# Patient Record
Sex: FEMALE | Race: WHITE | NOT HISPANIC OR LATINO | Employment: FULL TIME | URBAN - NONMETROPOLITAN AREA
[De-identification: names, ages, dates, MRNs, and addresses within clinical notes are randomized per-mention and may not be internally consistent; named-entity substitution may affect disease eponyms.]

---

## 2018-03-13 ENCOUNTER — TRANSCRIBE ORDERS (OUTPATIENT)
Dept: ADMINISTRATIVE | Facility: HOSPITAL | Age: 48
End: 2018-03-13

## 2018-03-13 ENCOUNTER — APPOINTMENT (OUTPATIENT)
Dept: LAB | Facility: HOSPITAL | Age: 48
End: 2018-03-13
Attending: INTERNAL MEDICINE

## 2018-03-13 DIAGNOSIS — E03.9 HYPOTHYROIDISM, UNSPECIFIED TYPE: ICD-10-CM

## 2018-03-13 DIAGNOSIS — R53.83 OTHER FATIGUE: Primary | ICD-10-CM

## 2018-03-13 LAB
25(OH)D3 SERPL-MCNC: 24 NG/ML
T4 FREE SERPL-MCNC: 0.81 NG/DL (ref 0.78–2.19)
TSH SERPL DL<=0.05 MIU/L-ACNC: 2.54 MIU/ML (ref 0.47–4.68)
VIT B12 BLD-MCNC: 450 PG/ML (ref 239–931)

## 2018-03-13 PROCEDURE — 84439 ASSAY OF FREE THYROXINE: CPT | Performed by: INTERNAL MEDICINE

## 2018-03-13 PROCEDURE — 82306 VITAMIN D 25 HYDROXY: CPT | Performed by: INTERNAL MEDICINE

## 2018-03-13 PROCEDURE — 84443 ASSAY THYROID STIM HORMONE: CPT | Performed by: INTERNAL MEDICINE

## 2018-03-13 PROCEDURE — 36415 COLL VENOUS BLD VENIPUNCTURE: CPT | Performed by: INTERNAL MEDICINE

## 2018-03-13 PROCEDURE — 82607 VITAMIN B-12: CPT | Performed by: INTERNAL MEDICINE

## 2019-01-03 ENCOUNTER — APPOINTMENT (OUTPATIENT)
Dept: CT IMAGING | Facility: HOSPITAL | Age: 49
End: 2019-01-03
Payer: OTHER MISCELLANEOUS

## 2019-01-03 ENCOUNTER — HOSPITAL ENCOUNTER (EMERGENCY)
Facility: HOSPITAL | Age: 49
Discharge: HOME | End: 2019-01-04
Attending: FAMILY MEDICINE
Payer: OTHER MISCELLANEOUS

## 2019-01-03 VITALS
HEIGHT: 66 IN | RESPIRATION RATE: 16 BRPM | WEIGHT: 188 LBS | BODY MASS INDEX: 30.22 KG/M2 | HEART RATE: 93 BPM | TEMPERATURE: 96.9 F | OXYGEN SATURATION: 100 % | DIASTOLIC BLOOD PRESSURE: 94 MMHG | SYSTOLIC BLOOD PRESSURE: 144 MMHG

## 2019-01-03 DIAGNOSIS — S16.1XXA ACUTE STRAIN OF NECK MUSCLE, INITIAL ENCOUNTER: Primary | ICD-10-CM

## 2019-01-03 PROCEDURE — 6370000000 HC RX 637 (ALT 250 FOR IP): Performed by: FAMILY MEDICINE

## 2019-01-03 PROCEDURE — 72125 CT NECK SPINE W/O DYE: CPT

## 2019-01-03 PROCEDURE — 99283 EMERGENCY DEPT VISIT LOW MDM: CPT

## 2019-01-03 RX ORDER — HYDROCODONE BITARTRATE AND ACETAMINOPHEN 5; 325 MG/1; MG/1
1 TABLET ORAL ONCE
Status: COMPLETED | OUTPATIENT
Start: 2019-01-03 | End: 2019-01-03

## 2019-01-03 RX ORDER — LEVOTHYROXINE SODIUM 88 UG/1
88 TABLET ORAL DAILY
COMMUNITY
End: 2022-01-08 | Stop reason: DRUGHIGH

## 2019-01-03 RX ADMIN — HYDROCODONE BITARTRATE AND ACETAMINOPHEN 1 TABLET: 5; 325 TABLET ORAL at 23:10

## 2019-01-03 ASSESSMENT — PAIN DESCRIPTION - LOCATION: LOCATION: NECK;BACK

## 2019-01-03 ASSESSMENT — PAIN SCALES - GENERAL
PAINLEVEL_OUTOF10: 6
PAINLEVEL_OUTOF10: 7

## 2019-01-03 ASSESSMENT — PAIN DESCRIPTION - ORIENTATION: ORIENTATION: MID;UPPER

## 2019-01-03 ASSESSMENT — PAIN DESCRIPTION - PAIN TYPE: TYPE: ACUTE PAIN

## 2020-03-11 ENCOUNTER — OFFICE (OUTPATIENT)
Dept: URBAN - METROPOLITAN AREA CLINIC 4 | Facility: CLINIC | Age: 50
End: 2020-03-11

## 2020-03-11 VITALS — DIASTOLIC BLOOD PRESSURE: 85 MMHG | WEIGHT: 200 LBS | HEIGHT: 66 IN | SYSTOLIC BLOOD PRESSURE: 127 MMHG

## 2020-03-11 DIAGNOSIS — R16.0 HEPATOMEGALY, NOT ELSEWHERE CLASSIFIED: ICD-10-CM

## 2020-03-11 DIAGNOSIS — K21.9 GASTRO-ESOPHAGEAL REFLUX DISEASE WITHOUT ESOPHAGITIS: ICD-10-CM

## 2020-03-11 DIAGNOSIS — R13.10 DYSPHAGIA, UNSPECIFIED: ICD-10-CM

## 2020-03-11 PROCEDURE — 99205 OFFICE O/P NEW HI 60 MIN: CPT | Performed by: NURSE PRACTITIONER

## 2020-03-11 RX ORDER — METOCLOPRAMIDE HYDROCHLORIDE 10 MG/1
TABLET ORAL
Qty: 60 | Refills: 1 | Status: ACTIVE
Start: 2020-03-11

## 2020-03-11 RX ORDER — OMEPRAZOLE 20 MG/1
CAPSULE, DELAYED RELEASE ORAL
Qty: 0 | Refills: 0 | Status: ACTIVE

## 2021-01-26 ENCOUNTER — HOSPITAL ENCOUNTER (EMERGENCY)
Facility: HOSPITAL | Age: 51
Discharge: HOME | End: 2021-01-26
Attending: HOSPITALIST
Payer: COMMERCIAL

## 2021-01-26 VITALS
TEMPERATURE: 96 F | HEART RATE: 80 BPM | RESPIRATION RATE: 18 BRPM | SYSTOLIC BLOOD PRESSURE: 141 MMHG | OXYGEN SATURATION: 99 % | DIASTOLIC BLOOD PRESSURE: 89 MMHG
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
## 2021-01-26 PROCEDURE — 6370000000 HC RX 637 (ALT 250 FOR IP): Performed by: HOSPITALIST

## 2021-01-26 PROCEDURE — 99283 EMERGENCY DEPT VISIT LOW MDM: CPT

## 2021-01-26 RX ORDER — AMOXICILLIN AND CLAVULANATE POTASSIUM 875; 125 MG/1; MG/1
1 TABLET, FILM COATED ORAL 2 TIMES DAILY
Qty: 20 TABLET | Refills: 0 | Status: SHIPPED | OUTPATIENT
Start: 2021-01-26 | End: 2021-02-05

## 2021-01-26 RX ORDER — IBUPROFEN 800 MG/1
800 TABLET ORAL ONCE
Status: COMPLETED | OUTPATIENT
Start: 2021-01-26 | End: 2021-01-26

## 2021-01-26 RX ORDER — IBUPROFEN 800 MG/1
800 TABLET ORAL EVERY 6 HOURS PRN
Qty: 20 TABLET | Refills: 0 | Status: SHIPPED | OUTPATIENT
Start: 2021-01-26

## 2021-01-26 RX ORDER — GABAPENTIN 300 MG/1
300 CAPSULE ORAL PRN
COMMUNITY

## 2021-01-26 RX ORDER — AMOXICILLIN AND CLAVULANATE POTASSIUM 875; 125 MG/1; MG/1
1 TABLET, FILM COATED ORAL ONCE
Status: COMPLETED | OUTPATIENT
Start: 2021-01-26 | End: 2021-01-26

## 2021-01-26 RX ADMIN — AMOXICILLIN AND CLAVULANATE POTASSIUM 1 TABLET: 875; 125 TABLET, FILM COATED ORAL at 18:30

## 2021-01-26 RX ADMIN — IBUPROFEN 800 MG: 800 TABLET, FILM COATED ORAL at 18:30

## 2021-01-26 ASSESSMENT — PAIN DESCRIPTION - LOCATION: LOCATION: WRIST

## 2021-01-26 ASSESSMENT — PAIN DESCRIPTION - ORIENTATION: ORIENTATION: LEFT

## 2021-01-26 NOTE — ED PROVIDER NOTES
[address] ENCOUNTER      Pt Name: Jadiel Moody  MRN: [id number]  YOB: 1970  Date of evaluation: 1/26/2021  Provider: Fred Thrasher DO    CHIEF COMPLAINT       Chief Complaint   Patient presents with    Animal Bite         HISTORY OF PRESENT ILLNESS  (Location/Symptom, Timing/Onset, Context/Setting, Quality, Duration, Modifying Factors, Severity.)   Jadiel Moody is a 53 y.o. female who presents to the emergency department for dog bite. Patient states that she was fixing mathias and she believes her dog may have smelled it and come inside the house. Apparently they began fighting. She states that she tried to separate the 2 and one of the dogs bit her on the right wrist area. Patient complaining of 2 small lacerations to her wrist and forearm area. Patient states that she did have some numbness and tingling with the initiation of the bite however that is resolved she states she has full sensation out to the fingers and hand area. Denies any other trauma or injury. Denies any other complaint at this time. Patient states that she is just here for evaluation because the small little bites do heart quite a bit. Patient states that she cannot remember her last tetanus injection. Nursing notes were reviewed.     REVIEW OFSYSTEMS    (2-9 systems for level 4, 10 or more for level 5)   ROS:  General:  No fevers, no chills, no weakness  Cardiovascular:  No chest pain, no palpitations  Respiratory:  No shortness of breath, no cough, no wheezing  Gastrointestinal:  No pain, no nausea, no vomiting, no diarrhea  Musculoskeletal:  No muscle pain, no joint pain  Skin:  No rash, no easy bruising, + animal bite to the right wrist  Neurologic:  No speech problems, no headache, no extremity weakness  Psychiatric:  No anxiety  Genitourinary:  No dysuria, no hematuria    Except as noted above the remainder of the review of systems was reviewed and negative. PAST MEDICAL HISTORY     Past Medical History:   Diagnosis Date    Endometriosis     Fibroids     GERD (gastroesophageal reflux disease)     Thyroid disease          SURGICAL HISTORY       Past Surgical History:   Procedure Laterality Date    BREAST SURGERY       SECTION           CURRENT MEDICATIONS       Previous Medications    CYCLOBENZAPRINE (FLEXERIL) 10 MG TABLET    Take 10 mg by mouth 3 times daily as needed for Muscle spasms    GABAPENTIN (NEURONTIN) 300 MG CAPSULE    Take 300 mg by mouth as needed. LEVOTHYROXINE (SYNTHROID) 88 MCG TABLET    Take 88 mcg by mouth Daily    TRAMADOL (ULTRAM) 50 MG TABLET    Take 50 mg by mouth every 6 hours as needed for Pain       ALLERGIES     Patient has no known allergies. FAMILY HISTORY     History reviewed. No pertinent family history.        SOCIAL HISTORY       Social History     Socioeconomic History    Marital status:      Spouse name: None    Number of children: None    Years of education: None    Highest education level: None   Occupational History    None   Social Needs    Financial resource strain: None    Food insecurity     Worry: None     Inability: None    Transportation needs     Medical: None     Non-medical: None   Tobacco Use    Smoking status: Never Smoker    Smokeless tobacco: Never Used   Substance and Sexual Activity    Alcohol use: No    Drug use: No    Sexual activity: None   Lifestyle    Physical activity     Days per week: None     Minutes per session: None    Stress: None   Relationships    Social connections     Talks on phone: None     Gets together: None     Attends Synagogue service: None     Active member of club or organization: None     Attends meetings of clubs or organizations: None     Relationship status: None    Intimate partner violence     Fear of current or ex partner: None     Emotionally abused: None     Physically abused: None     Forced sexual activity: None   Other Topics Concern    None   Social History Narrative    None         PHYSICAL EXAM    (up to 7 for level 4, 8 or more for level 5)     ED Triage Vitals [01/26/21 1741]   BP Temp Temp src Pulse Resp SpO2 Height Weight   (!) 153/103 96 °F (35.6 °C) -- 83 18 98 % -- --       Physical Exam  General :Patient is awake, alert, oriented, in no acute distress, nontoxic appearing  HEENT: Pupils are equally round and reactive to light, EOMI, conjunctivae clear. Oral mucosa is moist, no exudate. Uvula is midline  Cardiac: Heart regular rate, rhythm, no murmurs, rubs, or gallops  Lungs: Lungs are clear to auscultation, there is no wheezing, rhonchi, or rales. There is no use of accessory muscles. Abdomen: Abdomen is soft, nontender, nondistended. There is no firm or pulsatile masses, no rebound rigidity or guarding. Musculoskeletal: 5 out of 5 strength in all 4 extremities. No focal muscle deficits are appreciated, range of motion of the right wrist is slightly decreased is secondary to pain from the small dog bites but otherwise cap refill is normal less than 3 seconds. Neurovascularly intact distally. Full sensory sensation intact to hand and fingers. Neuro: Motor intact, sensory intact, level of consciousness is normal, cerebellar function is normal, reflexes are grossly normal. No evidence of incontinence or loss of bowel or bladder function, no saddle anesthesia noted   Dermatology: Skin is warm and dry, small incision/laceration to the palmar aspect of the wrist on the radial side measuring 6 mm and a second small laceration/incision noted to the dorsal radial aspect of the wrist measuring 5 mm. Each could use one small stitch for closure however patient refused to have stitches. Psych: Mentation is grossly normal, cognition is grossly normal. Affect is appropriate.       DIAGNOSTIC RESULTS     EKG: All EKG's are interpreted by the Emergency Department Physician who either signs or Co-signs this chart in the

## 2021-01-26 NOTE — ED NOTES
2 small bite marks on right wrist cleaned with sterile water and hibaclense     Elsy Duty  01/26/21 8004

## 2022-01-08 ENCOUNTER — OFFICE VISIT (OUTPATIENT)
Dept: PRIMARY CARE CLINIC | Age: 52
End: 2022-01-08
Payer: COMMERCIAL

## 2022-01-08 VITALS
OXYGEN SATURATION: 97 % | TEMPERATURE: 97.4 F | DIASTOLIC BLOOD PRESSURE: 89 MMHG | BODY MASS INDEX: 30.79 KG/M2 | HEART RATE: 91 BPM | RESPIRATION RATE: 16 BRPM | HEIGHT: 66 IN | WEIGHT: 191.6 LBS | SYSTOLIC BLOOD PRESSURE: 125 MMHG

## 2022-01-08 DIAGNOSIS — J40 BRONCHITIS: ICD-10-CM

## 2022-01-08 DIAGNOSIS — R05.9 COUGH: Primary | ICD-10-CM

## 2022-01-08 PROCEDURE — 99212 OFFICE O/P EST SF 10 MIN: CPT | Performed by: NURSE PRACTITIONER

## 2022-01-08 RX ORDER — PREDNISONE 20 MG/1
20 TABLET ORAL 2 TIMES DAILY
Qty: 10 TABLET | Refills: 0 | Status: SHIPPED | OUTPATIENT
Start: 2022-01-08 | End: 2022-01-13

## 2022-01-08 RX ORDER — OMEPRAZOLE 20 MG/1
CAPSULE, DELAYED RELEASE ORAL
COMMUNITY

## 2022-01-08 RX ORDER — LEVOTHYROXINE SODIUM 0.03 MG/1
TABLET ORAL
COMMUNITY

## 2022-01-08 RX ORDER — AZITHROMYCIN 250 MG/1
250 TABLET, FILM COATED ORAL SEE ADMIN INSTRUCTIONS
Qty: 6 TABLET | Refills: 0 | Status: SHIPPED | OUTPATIENT
Start: 2022-01-08 | End: 2022-01-13

## 2022-01-08 ASSESSMENT — ENCOUNTER SYMPTOMS
SHORTNESS OF BREATH: 1
GASTROINTESTINAL NEGATIVE: 1
COUGH: 1

## 2022-01-08 ASSESSMENT — PATIENT HEALTH QUESTIONNAIRE - PHQ9: DEPRESSION UNABLE TO ASSESS: URGENT/EMERGENT SITUATION

## 2022-01-08 NOTE — PROGRESS NOTES
This is a 49 y.o. 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 Current Outpatient Medications   Medication Sig Dispense Refill    omeprazole (PRILOSEC) 20 MG delayed release capsule omeprazole 20 mg capsule,delayed release   Take 1 capsule every day by oral route in the morning.  levothyroxine (SYNTHROID) 25 MCG tablet Synthroid 25 mcg tablet      predniSONE (DELTASONE) 20 MG tablet Take 1 tablet by mouth 2 times daily for 5 days 10 tablet 0    azithromycin (ZITHROMAX) 250 MG tablet Take 1 tablet by mouth See Admin Instructions for 5 days 500mg on day 1 followed by 250mg on days 2 - 5 6 tablet 0    gabapentin (NEURONTIN) 300 MG capsule Take 300 mg by mouth as needed.  ibuprofen (IBU) 800 MG tablet Take 1 tablet by mouth every 6 hours as needed for Pain 20 tablet 0    traMADol (ULTRAM) 50 MG tablet Take 50 mg by mouth every 6 hours as needed for Pain       No current facility-administered medications for this visit. No Known Allergies    Review of Systems   Constitutional: Negative. HENT: Positive for congestion. Respiratory: Positive for cough and shortness of breath. Cardiovascular: Negative. Gastrointestinal: Negative. OBJECTIVE:     /89 (Site: Left Upper Arm, Position: Sitting, Cuff Size: Medium Adult)   Pulse 91   Temp 97.4 °F (36.3 °C) (Temporal)   Resp 16   Ht 5' 6\" (1.676 m)   Wt 191 lb 9.6 oz (86.9 kg)   LMP 05/03/2018   SpO2 97% Comment: room air  BMI 30.93 kg/m²      Physical Exam  Vitals and nursing note reviewed. Constitutional:       General: She is not in acute distress. Appearance: Normal appearance. She is normal weight.  She is not ill-appearing or toxic-appearing. HENT:      Head: Normocephalic. Nose: No congestion or rhinorrhea. Mouth/Throat:      Pharynx: No oropharyngeal exudate or posterior oropharyngeal erythema. Cardiovascular:      Rate and Rhythm: Normal rate and regular rhythm. Pulmonary:      Effort: Pulmonary effort is normal.      Breath sounds: Wheezing present. Comments: Slight wheeze  Right lung field   Musculoskeletal:      Cervical back: Normal range of motion. No rigidity. Lymphadenopathy:      Cervical: No cervical adenopathy. Skin:     General: Skin is warm and dry. Neurological:      General: No focal deficit present. Mental Status: She is alert and oriented to person, place, and time. Orders Placed This Encounter   Medications    predniSONE (DELTASONE) 20 MG tablet     Sig: Take 1 tablet by mouth 2 times daily for 5 days     Dispense:  10 tablet     Refill:  0    azithromycin (ZITHROMAX) 250 MG tablet     Sig: Take 1 tablet by mouth See Admin Instructions for 5 days 500mg on day 1 followed by 250mg on days 2 - 5     Dispense:  6 tablet     Refill:  0        Orders Placed This Encounter   Medications    predniSONE (DELTASONE) 20 MG tablet     Sig: Take 1 tablet by mouth 2 times daily for 5 days     Dispense:  10 tablet     Refill:  0    azithromycin (ZITHROMAX) 250 MG tablet     Sig: Take 1 tablet by mouth See Admin Instructions for 5 days 500mg on day 1 followed by 250mg on days 2 - 5     Dispense:  6 tablet     Refill:  0       ASSESSMENT/PLAN  1. Cough bronchitis ,  Add z pack and prednisone . No follow-ups on file.

## 2023-04-17 ENCOUNTER — HOSPITAL ENCOUNTER (OUTPATIENT)
Dept: MRI IMAGING | Facility: HOSPITAL | Age: 53
Discharge: HOME | End: 2023-04-17
Payer: COMMERCIAL

## 2023-04-17 DIAGNOSIS — M25.512 LEFT SHOULDER PAIN, UNSPECIFIED CHRONICITY: ICD-10-CM

## 2023-04-17 PROCEDURE — 73221 MRI JOINT UPR EXTREM W/O DYE: CPT

## 2024-01-06 ENCOUNTER — HOSPITAL ENCOUNTER (EMERGENCY)
Facility: HOSPITAL | Age: 54
Discharge: HOME | End: 2024-01-06
Attending: EMERGENCY MEDICINE
Payer: COMMERCIAL

## 2024-01-06 VITALS
HEART RATE: 85 BPM | RESPIRATION RATE: 20 BRPM | OXYGEN SATURATION: 95 % | BODY MASS INDEX: 30.92 KG/M2 | SYSTOLIC BLOOD PRESSURE: 126 MMHG | HEIGHT: 67 IN | DIASTOLIC BLOOD PRESSURE: 91 MMHG | WEIGHT: 197 LBS | TEMPERATURE: 98.2 F

## 2024-01-06 DIAGNOSIS — M54.32 BILATERAL SCIATICA: Primary | ICD-10-CM

## 2024-01-06 DIAGNOSIS — M54.31 BILATERAL SCIATICA: Primary | ICD-10-CM

## 2024-01-06 PROCEDURE — 99283 EMERGENCY DEPT VISIT LOW MDM: CPT

## 2024-01-06 RX ORDER — GLUCOSAMINE HCL 500 MG
TABLET ORAL
COMMUNITY

## 2024-01-06 RX ORDER — METHYLPREDNISOLONE 4 MG/1
TABLET ORAL
Qty: 1 KIT | Refills: 0 | Status: SHIPPED | OUTPATIENT
Start: 2024-01-06 | End: 2024-01-12

## 2024-01-06 RX ORDER — CYCLOBENZAPRINE HCL 10 MG
10 TABLET ORAL 3 TIMES DAILY PRN
Qty: 21 TABLET | Refills: 0 | Status: SHIPPED | OUTPATIENT
Start: 2024-01-06 | End: 2024-01-16

## 2024-01-06 ASSESSMENT — LIFESTYLE VARIABLES
HOW MANY STANDARD DRINKS CONTAINING ALCOHOL DO YOU HAVE ON A TYPICAL DAY: PATIENT DOES NOT DRINK
HOW OFTEN DO YOU HAVE A DRINK CONTAINING ALCOHOL: NEVER

## 2024-01-06 ASSESSMENT — PAIN - FUNCTIONAL ASSESSMENT: PAIN_FUNCTIONAL_ASSESSMENT: NONE - DENIES PAIN

## 2024-01-06 NOTE — ED TRIAGE NOTES
Patient to ED with mid / LBP, she describes the pain as \"extreme soreness\", ongoing for a couple days. Denies injury. There is no pain while she is sitting but is bad when she moves. She states she had a dull ache around the same area for about 3 months and now has evolved into more pain.

## 2024-01-06 NOTE — ED NOTES
AVS and Rx reviewed with patient, understanding verbalized. Patient discharged home with no further needs or concerns voiced. PCP follow up recommended and /or return to ED as needed.

## 2024-01-06 NOTE — ED PROVIDER NOTES
without swelling, erythema or bruising.  She has some paravertebral tenderness as well.  She is able to do a squat, toe raise and heel stand without difficulty.  She has 2+ DTRs in the patellas, normal sensation in the legs and the feet.  Good dorsalis pedis pulses bilaterally.  There are no rashes on the skin.        No orders to display            EMERGENCY DEPARTMENT COURSE and DIFFERENTIAL DIAGNOSIS/MDM:     Vitals:    Vitals:    01/06/24 1023   BP: (!) 126/91   Pulse: 85   Resp: 20   Temp: 98.2 °F (36.8 °C)   TempSrc: Oral   SpO2: 95%   Weight: 89.4 kg (197 lb)   Height: 1.7 m (5' 6.93\")        Patient was given the following medications:   Medications - No data to display          CC/HPI Summary, DDx, ED Course, and Reassessment: Patient came in for low back pain originating in the center going to the buttocks bilaterally not down the legs.  This is concerning for sciatica in the acute setting, she is low risk for spinal epidural abscess discitis or cauda equina syndrome.  Recommended course of steroids and a muscle relaxer, discussion with her doctor in the next 48 hours about getting an outpatient MRI and returning if symptoms progress.    CONSULTS: None   Social Determinants: None   Chronic Conditions: NA    Disposition Considerations: None   I utilized an evidence-based risk rating tool (CMT) along with my training and experience to weigh the risk of discharge against the risks of further testing, imaging, or hospitalization. At this time I estimate the risks of additional testing, imaging, or hospitalization to be equal to or greater than the risk of discharge. I discussed my risk assessment with the patient and the patient consents to the risk of discharge as well as the risk of uncertainty in estimating outcomes.  Given the symptoms and findings present at this time, the chance of SEA or SCC is so remote that additional testing or imaging is more likely to harm the patient than diagnose SEA.

## 2024-05-16 ENCOUNTER — OFFICE VISIT (OUTPATIENT)
Dept: ENDOCRINOLOGY | Facility: CLINIC | Age: 54
End: 2024-05-16
Payer: COMMERCIAL

## 2024-05-16 VITALS
OXYGEN SATURATION: 95 % | SYSTOLIC BLOOD PRESSURE: 126 MMHG | BODY MASS INDEX: 46.79 KG/M2 | WEIGHT: 208 LBS | DIASTOLIC BLOOD PRESSURE: 74 MMHG | HEART RATE: 84 BPM | HEIGHT: 56 IN

## 2024-05-16 DIAGNOSIS — E04.2 MULTIPLE THYROID NODULES: ICD-10-CM

## 2024-05-16 DIAGNOSIS — E03.9 HYPOTHYROIDISM, UNSPECIFIED TYPE: Primary | ICD-10-CM

## 2024-05-16 LAB
T4 FREE SERPL-MCNC: 1.16 NG/DL (ref 0.93–1.7)
TSH SERPL DL<=0.05 MIU/L-ACNC: 2.64 UIU/ML (ref 0.27–4.2)

## 2024-05-16 PROCEDURE — 84443 ASSAY THYROID STIM HORMONE: CPT | Performed by: INTERNAL MEDICINE

## 2024-05-16 PROCEDURE — 84439 ASSAY OF FREE THYROXINE: CPT | Performed by: INTERNAL MEDICINE

## 2024-05-16 RX ORDER — TRAMADOL HYDROCHLORIDE 50 MG/1
50 TABLET ORAL AS NEEDED
COMMUNITY
Start: 2024-01-03

## 2024-05-16 RX ORDER — GABAPENTIN 300 MG/1
300 CAPSULE ORAL DAILY
COMMUNITY
Start: 2024-03-22

## 2024-05-16 NOTE — PROGRESS NOTES
Chief Complaint   Patient presents with    Hypothyroidism        New patient who is being seen in consultation regarding hypothyroidism, thyroid cyst at the request of Holden Heredia   Beatris Reyna is a 53 y.o. female who presents for evaluation of hypothyroidism, thyroid cyst.    Patient reports prior diagnosis of hypothyroidism and was previously taking levothyroxine 12.5 mcg daily but has not taken this medication in approximately 1 year.  She reports on higher dose that she did not feel well.  She has not had recent thyroid function testing.    Patient has had multiple prior thyroid ultrasounds, the most recent was in 2023.  She has never had FNA of thyroid nodule.    Past Medical History:   Diagnosis Date    Hypothyroidism      Past Surgical History:   Procedure Laterality Date    BREAST SURGERY       SECTION      times 2    COLONOSCOPY      D & C AND LAPAROSCOPY      ENDOSCOPY        Family History   Problem Relation Age of Onset    Hypertension Mother     Coronary artery disease Mother     Hyperlipidemia Mother     No Known Problems Father     No Known Problems Half-Sister     No Known Problems Son     No Known Problems Son       Social History     Socioeconomic History    Marital status:    Tobacco Use    Smoking status: Never   Vaping Use    Vaping status: Never Used   Substance and Sexual Activity    Alcohol use: Never    Drug use: Never    Sexual activity: Defer      No Known Allergies   Current Outpatient Medications on File Prior to Visit   Medication Sig Dispense Refill    gabapentin (NEURONTIN) 300 MG capsule Take 1 capsule by mouth Daily.      traMADol (ULTRAM) 50 MG tablet Take 1 tablet by mouth As Needed.       No current facility-administered medications on file prior to visit.        Review of Systems   Constitutional:  Positive for fatigue.   Eyes:  Positive for visual disturbance.   Gastrointestinal:  Positive for GERD.   Endocrine: Positive for  "polyuria.   Musculoskeletal:  Positive for arthralgias, back pain, neck pain and neck stiffness.   Neurological:  Positive for headache.   Psychiatric/Behavioral:  Positive for sleep disturbance.       Vitals:    05/16/24 0954   BP: 126/74   BP Location: Right arm   Patient Position: Sitting   Cuff Size: Adult   Pulse: 84   SpO2: 95%   Weight: 94.3 kg (208 lb)   Height: 142.2 cm (56\")   Body mass index is 46.63 kg/m².     Physical Exam  Vitals reviewed.   Neck:      Thyroid: No thyromegaly.   Cardiovascular:      Rate and Rhythm: Normal rate and regular rhythm.   Pulmonary:      Effort: Pulmonary effort is normal.      Breath sounds: Normal breath sounds.   Neurological:      General: No focal deficit present.      Mental Status: She is alert.   Psychiatric:         Mood and Affect: Mood and affect normal.         Behavior: Behavior is cooperative.          Labs/Imaging  Small nodules as above.              Images reviewed, interpreted, and dictated by Dr. JOESPH Lares.  Transcribed by Briseida Wolfe PA-C.  Narrative    THYROID ULTRASOUND    HISTORY: Thyroid nodule.    PROCEDURE: Ultrasound images of the thyroid were obtained.    FINDINGS: The right lobe of the thyroid measures 4.2 x 1.3 x 1.0 cm.  There are a few sub-5 mm TR 3 cysts within the right lobe of the  thyroid. There is a small hypoechoic TR 4 nodule measuring up to 5 mm.    Left lobe of the thyroid measures 3.4 x 1.2 x 1.1 cm. There are a few  sub-5 mm TR 3 cysts within the left lobe of the thyroid.    Thyroid ultrasound dated 10/26/2021  Right thyroid lobe measures 35 x 10 mm, within this is a 6 mm hypoechoic TR 4 nodule  Left thyroid lobe measures 32 x 11 mm and contains two 8 mm hypoechoic TR 4 nodules    Thyroid ultrasound dated 9/18/2020  Right thyroid lobe measures 4.8 x 0.9 x 1.5 cm  Left thyroid lobe measures 3.6 x 0.8 centimeters with small cystic foci    Thyroid ultrasound dated 11/22/2017  Right thyroid lobe measures 4.3 x 1.0 x " 1.3 cm  Left thyroid lobe measures 4 x 0.8 x 1.5 cm with 5 mm hypoechoic left thyroid nodule        Assessment and Plan    Diagnoses and all orders for this visit:    1. Hypothyroidism, unspecified type (Primary)  -     TSH; Future  -     T4, Free; Future  Patient is not currently taking levothyroxine, she reports she has not taken this in approximately 1 year.  Patient reports prior dose of 12.5 mcg daily.  She reported adverse effect on higher doses.  Plan to update labs today and determine next steps.  We reviewed threshold for initiation of thyroid hormone.  Patient is clinically euthyroid.    2. Multiple thyroid nodules  Reviewed available ultrasound reports from 2017 to present  Patient with bilateral subcentimeter nodules/cysts.  Discussed restratification of thyroid nodules and criteria use for determination of the need for further follow-up or potential FNA.  Patient does not have any lesions which would be criteria for FNA.  Discussed that given relative stability and small size of lesions reported on ultrasound we could likely start to space out repeat imaging.  Tentatively plan for repeat imaging 2 years from prior unless there is a clinical change.  Patient voiced understanding.       Return in about 1 year (around 5/16/2025) for Next scheduled follow up. The patient was instructed to contact the clinic with any interval questions or concerns.    Electronically signed by: Klarissa Madden MD     Dictated Utilizing Dragon Dictation

## 2024-05-26 ENCOUNTER — HOSPITAL ENCOUNTER (EMERGENCY)
Facility: HOSPITAL | Age: 54
Discharge: HOME | End: 2024-05-26
Attending: EMERGENCY MEDICINE
Payer: COMMERCIAL

## 2024-05-26 VITALS
TEMPERATURE: 98.8 F | BODY MASS INDEX: 32.14 KG/M2 | WEIGHT: 200 LBS | OXYGEN SATURATION: 96 % | RESPIRATION RATE: 20 BRPM | HEART RATE: 93 BPM | SYSTOLIC BLOOD PRESSURE: 141 MMHG | DIASTOLIC BLOOD PRESSURE: 94 MMHG | HEIGHT: 66 IN
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
## 2024-05-26 DIAGNOSIS — J06.9 VIRAL URI WITH COUGH: Primary | ICD-10-CM

## 2024-05-26 DIAGNOSIS — S20.169A: ICD-10-CM

## 2024-05-26 LAB
FLUAV AG NPH QL: NEGATIVE
FLUBV AG NPH QL: NEGATIVE
RSV AG NOSE QL: NEGATIVE
SARS-COV-2 RDRP RESP QL NAA+PROBE: NOT DETECTED

## 2024-05-26 PROCEDURE — 99283 EMERGENCY DEPT VISIT LOW MDM: CPT

## 2024-05-26 PROCEDURE — 86757 RICKETTSIA ANTIBODY: CPT

## 2024-05-26 PROCEDURE — 86666 EHRLICHIA ANTIBODY: CPT

## 2024-05-26 PROCEDURE — 86618 LYME DISEASE ANTIBODY: CPT

## 2024-05-26 PROCEDURE — 87804 INFLUENZA ASSAY W/OPTIC: CPT

## 2024-05-26 PROCEDURE — 87807 RSV ASSAY W/OPTIC: CPT

## 2024-05-26 PROCEDURE — 87635 SARS-COV-2 COVID-19 AMP PRB: CPT

## 2024-05-26 PROCEDURE — 86753 PROTOZOA ANTIBODY NOS: CPT

## 2024-05-26 PROCEDURE — 6370000000 HC RX 637 (ALT 250 FOR IP): Performed by: EMERGENCY MEDICINE

## 2024-05-26 RX ORDER — BENZONATATE 100 MG/1
100 CAPSULE ORAL 3 TIMES DAILY PRN
Qty: 30 CAPSULE | Refills: 0 | Status: SHIPPED | OUTPATIENT
Start: 2024-05-26 | End: 2024-06-05

## 2024-05-26 RX ORDER — DOXYCYCLINE 100 MG/1
100 CAPSULE ORAL ONCE
Status: COMPLETED | OUTPATIENT
Start: 2024-05-26 | End: 2024-05-26

## 2024-05-26 RX ORDER — DOXYCYCLINE HYCLATE 100 MG
100 TABLET ORAL 2 TIMES DAILY
Qty: 20 TABLET | Refills: 0 | Status: SHIPPED | OUTPATIENT
Start: 2024-05-26 | End: 2024-06-05

## 2024-05-26 RX ORDER — BENZONATATE 100 MG/1
100 CAPSULE ORAL ONCE
Status: COMPLETED | OUTPATIENT
Start: 2024-05-26 | End: 2024-05-26

## 2024-05-26 RX ADMIN — DOXYCYCLINE 100 MG: 100 CAPSULE ORAL at 08:28

## 2024-05-26 RX ADMIN — BENZONATATE 100 MG: 100 CAPSULE ORAL at 07:44

## 2024-05-26 ASSESSMENT — PAIN - FUNCTIONAL ASSESSMENT: PAIN_FUNCTIONAL_ASSESSMENT: NONE - DENIES PAIN

## 2024-05-26 NOTE — ED NOTES
Discharge instructions provided to patient. Patient verbalizes understanding of d/c plan and follow up care. Patient ambulatory off unit to POV at this time with no further needs noted.

## 2024-05-26 NOTE — ED PROVIDER NOTES
.        [facility]  EMERGENCY DEPARTMENT ENCOUNTER        Pt Name: Maribel Walker  MRN: [id number]  Birthdate 1970  Date of evaluation: 2024  Provider: Anju Madrid MD  PCP: Tommy Oneil MD  Note Started: 7:13 AM EDT 24    CHIEF COMPLAINT       Chief Complaint   Patient presents with    Wheezing    Cough       HISTORY OF PRESENT ILLNESS: 1 or more Elements     History from : Patient    Limitations to history : None    Maribel Walker is a 53 y.o. female who presents complaining of a cough productive of yellow sputum associated with a headache for the last 2 days she states is a \"barking cough\" denies fever denies head congestion denies sore throat she has used her children's neb treatments with some relief.  Of note she also sustained a tick bite on her chest about 6 days ago.    Nursing Notes were all reviewed and agreed with or any disagreements were addressed in the HPI.    REVIEW OF SYSTEMS :      Review of Systems     systems reviewed and negative except as in HPI/MDM    SURGICAL HISTORY     Past Surgical History:   Procedure Laterality Date    BREAST SURGERY       SECTION         CURRENTMEDICATIONS       Previous Medications    CHOLECALCIFEROL (VITAMIN D3) 75 MCG (3000 UT) TABS    Take by mouth    CYANOCOBALAMIN (VITAMIN B 12 PO)    Take by mouth    GABAPENTIN (NEURONTIN) 300 MG CAPSULE    Take 100 mg by mouth as needed.    IBUPROFEN (IBU) 800 MG TABLET    Take 1 tablet by mouth every 6 hours as needed for Pain    LEVOTHYROXINE (SYNTHROID) 25 MCG TABLET    Synthroid 25 mcg tablet    OMEPRAZOLE (PRILOSEC) 20 MG DELAYED RELEASE CAPSULE    omeprazole 20 mg capsule,delayed release   Take 1 capsule every day by oral route in the morning.    TRAMADOL (ULTRAM) 50 MG TABLET    Take 1 tablet by mouth every 6 hours as needed for Pain.       ALLERGIES     Patient has no known allergies.    FAMILYHISTORY     History reviewed. No pertinent family

## 2024-05-29 LAB
R RICKETTSI IGG TITR SER IF: NORMAL {TITER}
R RICKETTSI IGM TITR SER IF: NORMAL {TITER}

## 2024-05-31 LAB — MISCELLANEOUS LAB TEST ORDER: NORMAL

## 2024-06-01 ENCOUNTER — OFFICE VISIT (OUTPATIENT)
Dept: PRIMARY CARE CLINIC | Age: 54
End: 2024-06-01
Payer: COMMERCIAL

## 2024-06-01 VITALS
HEART RATE: 82 BPM | DIASTOLIC BLOOD PRESSURE: 88 MMHG | BODY MASS INDEX: 32.47 KG/M2 | WEIGHT: 202 LBS | OXYGEN SATURATION: 98 % | SYSTOLIC BLOOD PRESSURE: 139 MMHG | RESPIRATION RATE: 16 BRPM | TEMPERATURE: 97.1 F | HEIGHT: 66 IN

## 2024-06-01 DIAGNOSIS — J06.9 UPPER RESPIRATORY TRACT INFECTION, UNSPECIFIED TYPE: ICD-10-CM

## 2024-06-01 DIAGNOSIS — J40 BRONCHITIS: Primary | ICD-10-CM

## 2024-06-01 PROCEDURE — 99213 OFFICE O/P EST LOW 20 MIN: CPT | Performed by: NURSE PRACTITIONER

## 2024-06-01 RX ORDER — BROMPHENIRAMINE MALEATE, PSEUDOEPHEDRINE HYDROCHLORIDE, AND DEXTROMETHORPHAN HYDROBROMIDE 2; 30; 10 MG/5ML; MG/5ML; MG/5ML
5 SYRUP ORAL 4 TIMES DAILY PRN
Qty: 118 ML | Refills: 0 | Status: SHIPPED | OUTPATIENT
Start: 2024-06-01

## 2024-06-01 RX ORDER — AZITHROMYCIN 250 MG/1
TABLET, FILM COATED ORAL
Qty: 6 TABLET | Refills: 0 | Status: SHIPPED | OUTPATIENT
Start: 2024-06-01 | End: 2024-06-11

## 2024-06-01 ASSESSMENT — ENCOUNTER SYMPTOMS
RHINORRHEA: 0
SORE THROAT: 0
WHEEZING: 0
SHORTNESS OF BREATH: 0
COUGH: 1

## 2024-06-01 NOTE — PROGRESS NOTES
Chief Complaint   Patient presents with    Cough    Chest Congestion       Have you seen any other physician or provider since your last visit yes - [facility]    Have you had any other diagnostic tests since your last visit? yes - labs    Have you changed or stopped any medications since your last visit? yes - started Doxycyline and Tessalon abiel      Patient was seen in [facility] last week and diagnosed with bronchitis. She was started on Doxycycline and Tessalon abiel with no relief. Her cough is worse at night.

## 2024-06-01 NOTE — PROGRESS NOTES
SUBJECTIVE:    Patient ID: Maribel Walker is a 53 y.o.female.    Chief Complaint   Patient presents with    Cough    Chest Congestion       HPI:    Presents today with c/o bronchitis and wheezing for over 1 week. The cough has been keeping her up at night. She was prescribed Tessalon Perles and Doxycycline at the ED last Sunday, it has helped a little but she is still having a cough that is interfering with her daily activities. Denies fever, body ache, chills, nasal drainage, ear or sinus pain.    Patient's medications, allergies, past medical, surgical, social and family histories were reviewed and updated as appropriate in electronic medical record.      Current Outpatient Medications on File Prior to Visit   Medication Sig Dispense Refill    doxycycline hyclate (VIBRA-TABS) 100 MG tablet Take 1 tablet by mouth 2 times daily for 10 days 20 tablet 0    benzonatate (TESSALON) 100 MG capsule Take 1 capsule by mouth 3 times daily as needed for Cough 30 capsule 0    Cyanocobalamin (VITAMIN B 12 PO) Take by mouth      Cholecalciferol (VITAMIN D3) 75 MCG (3000 UT) TABS Take by mouth      omeprazole (PRILOSEC) 20 MG delayed release capsule omeprazole 20 mg capsule,delayed release   Take 1 capsule every day by oral route in the morning.      gabapentin (NEURONTIN) 300 MG capsule Take 100 mg by mouth as needed.      ibuprofen (IBU) 800 MG tablet Take 1 tablet by mouth every 6 hours as needed for Pain 20 tablet 0    traMADol (ULTRAM) 50 MG tablet Take 1 tablet by mouth every 6 hours as needed for Pain.       No current facility-administered medications on file prior to visit.        Review of Systems   Constitutional:  Negative for chills and fever.   HENT:  Negative for ear pain, postnasal drip, rhinorrhea and sore throat.    Respiratory:  Positive for cough. Negative for shortness of breath and wheezing.    Cardiovascular:  Negative for chest pain.   All other systems reviewed and are negative.      Past Medical

## 2024-09-17 ENCOUNTER — HOSPITAL ENCOUNTER (EMERGENCY)
Facility: HOSPITAL | Age: 54
Discharge: HOME | End: 2024-09-17
Attending: STUDENT IN AN ORGANIZED HEALTH CARE EDUCATION/TRAINING PROGRAM
Payer: COMMERCIAL

## 2024-09-17 VITALS
WEIGHT: 198 LBS | RESPIRATION RATE: 17 BRPM | SYSTOLIC BLOOD PRESSURE: 156 MMHG | BODY MASS INDEX: 31.82 KG/M2 | HEART RATE: 82 BPM | OXYGEN SATURATION: 97 % | TEMPERATURE: 98.3 F | HEIGHT: 66 IN | DIASTOLIC BLOOD PRESSURE: 101 MMHG

## 2024-09-17 DIAGNOSIS — L03.211 CELLULITIS OF FACE: Primary | ICD-10-CM

## 2024-09-17 PROCEDURE — 99283 EMERGENCY DEPT VISIT LOW MDM: CPT

## 2024-09-17 RX ORDER — CEFUROXIME AXETIL 500 MG/1
500 TABLET ORAL 2 TIMES DAILY
Qty: 10 TABLET | Refills: 0 | Status: SHIPPED | OUTPATIENT
Start: 2024-09-17 | End: 2024-09-22

## 2024-09-17 ASSESSMENT — PAIN DESCRIPTION - LOCATION: LOCATION: NOSE

## 2024-09-17 ASSESSMENT — PAIN DESCRIPTION - DESCRIPTORS: DESCRIPTORS: SORE

## 2024-09-17 ASSESSMENT — LIFESTYLE VARIABLES
HOW OFTEN DO YOU HAVE A DRINK CONTAINING ALCOHOL: NEVER
HOW MANY STANDARD DRINKS CONTAINING ALCOHOL DO YOU HAVE ON A TYPICAL DAY: PATIENT DOES NOT DRINK

## 2024-09-17 ASSESSMENT — PAIN DESCRIPTION - PAIN TYPE: TYPE: ACUTE PAIN

## 2024-09-17 ASSESSMENT — PAIN - FUNCTIONAL ASSESSMENT: PAIN_FUNCTIONAL_ASSESSMENT: 0-10

## 2024-09-17 ASSESSMENT — PAIN DESCRIPTION - ORIENTATION: ORIENTATION: LEFT

## 2024-09-17 ASSESSMENT — PAIN SCALES - GENERAL: PAINLEVEL_OUTOF10: 2

## 2025-02-08 ENCOUNTER — OFFICE VISIT (OUTPATIENT)
Age: 55
End: 2025-02-08
Payer: COMMERCIAL

## 2025-02-08 VITALS
WEIGHT: 194 LBS | OXYGEN SATURATION: 98 % | SYSTOLIC BLOOD PRESSURE: 135 MMHG | BODY MASS INDEX: 31.18 KG/M2 | TEMPERATURE: 98.4 F | DIASTOLIC BLOOD PRESSURE: 95 MMHG | HEART RATE: 91 BPM | HEIGHT: 66 IN

## 2025-02-08 DIAGNOSIS — J02.0 STREP THROAT: Primary | ICD-10-CM

## 2025-02-08 DIAGNOSIS — J02.9 SORE THROAT: ICD-10-CM

## 2025-02-08 LAB
INFLUENZA A ANTIBODY: NORMAL
INFLUENZA B ANTIBODY: NORMAL
Lab: NORMAL
QC PASS/FAIL: NORMAL
S PYO AG THROAT QL: NORMAL
SARS-COV-2 RDRP RESP QL NAA+PROBE: NEGATIVE

## 2025-02-08 PROCEDURE — 87804 INFLUENZA ASSAY W/OPTIC: CPT | Performed by: NURSE PRACTITIONER

## 2025-02-08 PROCEDURE — 87635 SARS-COV-2 COVID-19 AMP PRB: CPT | Performed by: NURSE PRACTITIONER

## 2025-02-08 PROCEDURE — 99214 OFFICE O/P EST MOD 30 MIN: CPT | Performed by: NURSE PRACTITIONER

## 2025-02-08 PROCEDURE — 87880 STREP A ASSAY W/OPTIC: CPT | Performed by: NURSE PRACTITIONER

## 2025-02-08 RX ORDER — CEFDINIR 300 MG/1
300 CAPSULE ORAL 2 TIMES DAILY
Qty: 20 CAPSULE | Refills: 0 | Status: SHIPPED | OUTPATIENT
Start: 2025-02-08 | End: 2025-02-18

## 2025-02-08 ASSESSMENT — ENCOUNTER SYMPTOMS
SORE THROAT: 1
COUGH: 1
EYES NEGATIVE: 1
GASTROINTESTINAL NEGATIVE: 1

## 2025-02-08 ASSESSMENT — PATIENT HEALTH QUESTIONNAIRE - PHQ9: DEPRESSION UNABLE TO ASSESS: URGENT/EMERGENT SITUATION

## 2025-02-08 NOTE — PROGRESS NOTES
SUBJECTIVE:    Patient ID: Maribel Walker is a 54 y.o.female.    Chief Complaint   Patient presents with    Pharyngitis     54 y.o female presented here today with c/o sore throat.         HPI:  55 yo W F here for complaints of sore throat that started yesterday and got worse last night.  She works in the hospital.  She took some throat drops but they did not help.    Patient presents today with complaint of Pharyngitis (54 y.o female presented here today with c/o sore throat.  )       Patient's medications, allergies, past medical, surgical, social and family histories were reviewed and updated as appropriate in electronic medical record.      Current Outpatient Medications on File Prior to Visit   Medication Sig Dispense Refill    gabapentin (NEURONTIN) 300 MG capsule Take 100 mg by mouth as needed.      traMADol (ULTRAM) 50 MG tablet Take 1 tablet by mouth every 6 hours as needed for Pain.       No current facility-administered medications on file prior to visit.        Review of Systems   Constitutional: Negative.    HENT:  Positive for sore throat.    Eyes: Negative.    Respiratory:  Positive for cough.    Cardiovascular: Negative.    Gastrointestinal: Negative.    Endocrine: Negative.    Genitourinary: Negative.    Musculoskeletal: Negative.    Neurological: Negative.    Hematological: Negative.    Psychiatric/Behavioral: Negative.         Past Medical History:   Diagnosis Date    Endometriosis     Fibroids     GERD (gastroesophageal reflux disease)      Past Surgical History:   Procedure Laterality Date    BREAST SURGERY       SECTION       No family history on file.   Social History     Tobacco Use   Smoking Status Never   Smokeless Tobacco Never       OBJECTIVE:   Wt Readings from Last 3 Encounters:   25 88 kg (194 lb)   24 89.8 kg (198 lb)   24 91.6 kg (202 lb)     BP Readings from Last 3 Encounters:   25 (!) 135/95   24 (!) 156/101   24 139/88       BP

## 2025-02-10 ENCOUNTER — OFFICE VISIT (OUTPATIENT)
Age: 55
End: 2025-02-10
Payer: COMMERCIAL

## 2025-02-10 VITALS — OXYGEN SATURATION: 95 % | TEMPERATURE: 97.7 F | HEART RATE: 85 BPM

## 2025-02-10 DIAGNOSIS — J02.9 ACUTE PHARYNGITIS, UNSPECIFIED ETIOLOGY: Primary | ICD-10-CM

## 2025-02-10 PROCEDURE — 99213 OFFICE O/P EST LOW 20 MIN: CPT | Performed by: NURSE PRACTITIONER

## 2025-02-10 RX ORDER — ONDANSETRON 4 MG/1
4 TABLET, FILM COATED ORAL 3 TIMES DAILY PRN
Qty: 30 TABLET | Refills: 0 | Status: SHIPPED | OUTPATIENT
Start: 2025-02-10

## 2025-02-10 ASSESSMENT — PATIENT HEALTH QUESTIONNAIRE - PHQ9: DEPRESSION UNABLE TO ASSESS: URGENT/EMERGENT SITUATION

## 2025-02-10 NOTE — PROGRESS NOTES
Chief Complaint   Patient presents with    Pharyngitis     Patient tested positive for strep this weekend. She would like some zofran and magic mouthwash.

## 2025-02-10 NOTE — PROGRESS NOTES
SUBJECTIVE:    Patient ID: Maribel Walker is a 54 y.o.female.    Chief Complaint   Patient presents with    Pharyngitis     Patient tested positive for strep this weekend. She would like some zofran and magic mouthwash.            HPI:    Pt reports to clinic c/o worsening sore throat. Other S&S nausea. Diagnosed strep throat this past weekend and taking cefdinir with no improvement. Concerned about it not improving yet. Would like zofran for nausea and magic mouthwash for throat. No fever, CP, SOB. No other treatments.    Patient's medications, allergies, past medical, surgical, social and family histories were reviewed and updated as appropriate in electronic medical record.        Outpatient Medications Marked as Taking for the 2/10/25 encounter (Office Visit) with Carrie Leach APRN - CNP   Medication Sig Dispense Refill    cefdinir (OMNICEF) 300 MG capsule Take 1 capsule by mouth 2 times daily for 10 days 20 capsule 0    gabapentin (NEURONTIN) 300 MG capsule Take 100 mg by mouth as needed.      traMADol (ULTRAM) 50 MG tablet Take 1 tablet by mouth every 6 hours as needed for Pain.          Review of Systems   Constitutional:  Positive for fatigue.   HENT:  Positive for sore throat.    Gastrointestinal:  Positive for nausea.   All other systems reviewed and are negative.      Past Medical History:   Diagnosis Date    Endometriosis     Fibroids     GERD (gastroesophageal reflux disease)      Past Surgical History:   Procedure Laterality Date    BREAST SURGERY       SECTION       No family history on file.   Social History     Tobacco Use   Smoking Status Never   Smokeless Tobacco Never       OBJECTIVE:   Wt Readings from Last 3 Encounters:   25 88 kg (194 lb)   24 89.8 kg (198 lb)   24 91.6 kg (202 lb)     BP Readings from Last 3 Encounters:   25 (!) 135/95   24 (!) 156/101   24 139/88       Pulse 85   Temp 97.7 °F (36.5 °C)   LMP 2018   SpO2 95%

## 2025-02-12 ASSESSMENT — ENCOUNTER SYMPTOMS
SORE THROAT: 1
NAUSEA: 1

## 2025-08-31 ENCOUNTER — HOSPITAL ENCOUNTER (EMERGENCY)
Facility: HOSPITAL | Age: 55
Discharge: HOME | End: 2025-08-31
Attending: EMERGENCY MEDICINE
Payer: COMMERCIAL

## 2025-08-31 VITALS
OXYGEN SATURATION: 99 % | HEART RATE: 68 BPM | SYSTOLIC BLOOD PRESSURE: 168 MMHG | BODY MASS INDEX: 33.75 KG/M2 | DIASTOLIC BLOOD PRESSURE: 92 MMHG | RESPIRATION RATE: 18 BRPM | TEMPERATURE: 97.4 F | WEIGHT: 210 LBS | HEIGHT: 66 IN

## 2025-08-31 DIAGNOSIS — H53.8 BLURRED VISION, RIGHT EYE: Primary | ICD-10-CM

## 2025-08-31 DIAGNOSIS — I10 ESSENTIAL HYPERTENSION: ICD-10-CM

## 2025-08-31 PROCEDURE — 6370000000 HC RX 637 (ALT 250 FOR IP): Performed by: EMERGENCY MEDICINE

## 2025-08-31 PROCEDURE — 99283 EMERGENCY DEPT VISIT LOW MDM: CPT

## 2025-08-31 RX ORDER — CHOLECALCIFEROL (VITAMIN D3) 1250 MCG
5000 CAPSULE ORAL DAILY
COMMUNITY

## 2025-08-31 RX ORDER — UBIDECARENONE 75 MG
50 CAPSULE ORAL DAILY
COMMUNITY

## 2025-08-31 RX ORDER — LISINOPRIL 10 MG/1
10 TABLET ORAL DAILY
Qty: 30 TABLET | Refills: 0 | Status: SHIPPED | OUTPATIENT
Start: 2025-08-31 | End: 2025-09-30

## 2025-08-31 RX ORDER — CLONIDINE HYDROCHLORIDE 0.1 MG/1
0.1 TABLET ORAL ONCE
Status: COMPLETED | OUTPATIENT
Start: 2025-08-31 | End: 2025-08-31

## 2025-08-31 RX ORDER — ESTRADIOL 0.1 MG/G
2 CREAM VAGINAL DAILY
COMMUNITY
Start: 2025-03-25 | End: 2026-03-25

## 2025-08-31 RX ORDER — HYDRALAZINE HYDROCHLORIDE 20 MG/ML
10 INJECTION INTRAMUSCULAR; INTRAVENOUS ONCE
Status: DISCONTINUED | OUTPATIENT
Start: 2025-08-31 | End: 2025-08-31

## 2025-08-31 RX ORDER — HYDROCODONE BITARTRATE AND ACETAMINOPHEN 5; 325 MG/1; MG/1
1 TABLET ORAL EVERY 6 HOURS PRN
COMMUNITY

## 2025-08-31 RX ADMIN — CLONIDINE HYDROCHLORIDE 0.1 MG: 0.1 TABLET ORAL at 21:42

## 2025-08-31 ASSESSMENT — PAIN - FUNCTIONAL ASSESSMENT
PAIN_FUNCTIONAL_ASSESSMENT: 0-10
PAIN_FUNCTIONAL_ASSESSMENT: 0-10

## 2025-08-31 ASSESSMENT — VISUAL ACUITY
OS: 20/25
OD: 20/50
OU: 20/20

## 2025-08-31 ASSESSMENT — PAIN SCALES - GENERAL
PAINLEVEL_OUTOF10: 0
PAINLEVEL_OUTOF10: 0

## 2025-09-01 ASSESSMENT — ENCOUNTER SYMPTOMS
NAUSEA: 0
SHORTNESS OF BREATH: 0
WHEEZING: 0
ABDOMINAL PAIN: 0
STRIDOR: 0
CHEST TIGHTNESS: 0
EYE REDNESS: 0
DIARRHEA: 0
SINUS PRESSURE: 0
RHINORRHEA: 0
VOMITING: 0
BACK PAIN: 0
EYE PAIN: 0
SORE THROAT: 0
PHOTOPHOBIA: 0

## 2025-09-01 ASSESSMENT — VISUAL ACUITY: OU: 1
